# Patient Record
Sex: FEMALE | Race: WHITE | NOT HISPANIC OR LATINO | Employment: PART TIME | URBAN - METROPOLITAN AREA
[De-identification: names, ages, dates, MRNs, and addresses within clinical notes are randomized per-mention and may not be internally consistent; named-entity substitution may affect disease eponyms.]

---

## 2017-02-10 ENCOUNTER — OFFICE VISIT - HEALTHEAST (OUTPATIENT)
Dept: FAMILY MEDICINE | Facility: CLINIC | Age: 42
End: 2017-02-10

## 2017-02-10 DIAGNOSIS — J01.01 ACUTE RECURRENT MAXILLARY SINUSITIS: ICD-10-CM

## 2017-02-10 ASSESSMENT — MIFFLIN-ST. JEOR: SCORE: 1316.73

## 2017-05-23 ENCOUNTER — OFFICE VISIT - HEALTHEAST (OUTPATIENT)
Dept: FAMILY MEDICINE | Facility: CLINIC | Age: 42
End: 2017-05-23

## 2017-05-23 DIAGNOSIS — Z00.00 ROUTINE GENERAL MEDICAL EXAMINATION AT A HEALTH CARE FACILITY: ICD-10-CM

## 2017-05-23 DIAGNOSIS — Z79.899 MEDICATION MANAGEMENT: ICD-10-CM

## 2017-05-23 DIAGNOSIS — N76.0 ACUTE VAGINITIS: ICD-10-CM

## 2017-05-23 DIAGNOSIS — E78.5 DYSLIPIDEMIA: ICD-10-CM

## 2017-05-23 DIAGNOSIS — K21.9 GASTROESOPHAGEAL REFLUX DISEASE WITHOUT ESOPHAGITIS: ICD-10-CM

## 2017-05-23 DIAGNOSIS — G47.00 INSOMNIA, UNSPECIFIED TYPE: ICD-10-CM

## 2017-05-23 DIAGNOSIS — Z11.3 SCREENING FOR STD (SEXUALLY TRANSMITTED DISEASE): ICD-10-CM

## 2017-05-23 LAB
CHOLEST SERPL-MCNC: 185 MG/DL
FASTING STATUS PATIENT QL REPORTED: YES
HDLC SERPL-MCNC: 56 MG/DL
LDLC SERPL CALC-MCNC: 110 MG/DL
TRIGL SERPL-MCNC: 94 MG/DL

## 2017-05-23 ASSESSMENT — MIFFLIN-ST. JEOR: SCORE: 1303.12

## 2017-05-24 ENCOUNTER — AMBULATORY - HEALTHEAST (OUTPATIENT)
Dept: FAMILY MEDICINE | Facility: CLINIC | Age: 42
End: 2017-05-24

## 2017-05-24 ENCOUNTER — COMMUNICATION - HEALTHEAST (OUTPATIENT)
Dept: FAMILY MEDICINE | Facility: CLINIC | Age: 42
End: 2017-05-24

## 2017-05-24 DIAGNOSIS — G47.00 INSOMNIA, UNSPECIFIED TYPE: ICD-10-CM

## 2017-05-24 LAB
HSV1 IGG SERPL QL IA: POSITIVE
HSV2 IGG SERPL QL IA: NEGATIVE

## 2017-06-29 ENCOUNTER — OFFICE VISIT - HEALTHEAST (OUTPATIENT)
Dept: FAMILY MEDICINE | Facility: CLINIC | Age: 42
End: 2017-06-29

## 2017-06-29 DIAGNOSIS — G47.00 INSOMNIA, UNSPECIFIED TYPE: ICD-10-CM

## 2017-06-29 DIAGNOSIS — J01.00 SUBACUTE MAXILLARY SINUSITIS: ICD-10-CM

## 2017-06-29 ASSESSMENT — MIFFLIN-ST. JEOR: SCORE: 1295.4

## 2017-12-23 ENCOUNTER — COMMUNICATION - HEALTHEAST (OUTPATIENT)
Dept: FAMILY MEDICINE | Facility: CLINIC | Age: 42
End: 2017-12-23

## 2018-01-11 ENCOUNTER — RECORDS - HEALTHEAST (OUTPATIENT)
Dept: ADMINISTRATIVE | Facility: OTHER | Age: 43
End: 2018-01-11

## 2018-01-25 ENCOUNTER — RECORDS - HEALTHEAST (OUTPATIENT)
Dept: ADMINISTRATIVE | Facility: OTHER | Age: 43
End: 2018-01-25

## 2018-01-29 ENCOUNTER — RECORDS - HEALTHEAST (OUTPATIENT)
Dept: ADMINISTRATIVE | Facility: OTHER | Age: 43
End: 2018-01-29

## 2018-03-13 ENCOUNTER — COMMUNICATION - HEALTHEAST (OUTPATIENT)
Dept: FAMILY MEDICINE | Facility: CLINIC | Age: 43
End: 2018-03-13

## 2018-03-13 DIAGNOSIS — M26.609 TEMPOROMANDIBULAR JOINT DISORDER: ICD-10-CM

## 2018-03-19 ENCOUNTER — RECORDS - HEALTHEAST (OUTPATIENT)
Dept: ADMINISTRATIVE | Facility: OTHER | Age: 43
End: 2018-03-19

## 2018-03-22 ENCOUNTER — RECORDS - HEALTHEAST (OUTPATIENT)
Dept: ADMINISTRATIVE | Facility: OTHER | Age: 43
End: 2018-03-22

## 2018-04-09 ENCOUNTER — RECORDS - HEALTHEAST (OUTPATIENT)
Dept: ADMINISTRATIVE | Facility: OTHER | Age: 43
End: 2018-04-09

## 2018-04-23 ENCOUNTER — COMMUNICATION - HEALTHEAST (OUTPATIENT)
Dept: FAMILY MEDICINE | Facility: CLINIC | Age: 43
End: 2018-04-23

## 2018-04-30 ENCOUNTER — RECORDS - HEALTHEAST (OUTPATIENT)
Dept: ADMINISTRATIVE | Facility: OTHER | Age: 43
End: 2018-04-30

## 2018-05-16 ENCOUNTER — RECORDS - HEALTHEAST (OUTPATIENT)
Dept: ADMINISTRATIVE | Facility: OTHER | Age: 43
End: 2018-05-16

## 2018-05-24 ENCOUNTER — COMMUNICATION - HEALTHEAST (OUTPATIENT)
Dept: FAMILY MEDICINE | Facility: CLINIC | Age: 43
End: 2018-05-24

## 2018-06-18 ENCOUNTER — COMMUNICATION - HEALTHEAST (OUTPATIENT)
Dept: FAMILY MEDICINE | Facility: CLINIC | Age: 43
End: 2018-06-18

## 2018-06-18 DIAGNOSIS — M26.609 TEMPOROMANDIBULAR JOINT DISORDER: ICD-10-CM

## 2018-06-22 ENCOUNTER — COMMUNICATION - HEALTHEAST (OUTPATIENT)
Dept: FAMILY MEDICINE | Facility: CLINIC | Age: 43
End: 2018-06-22

## 2018-08-20 ENCOUNTER — COMMUNICATION - HEALTHEAST (OUTPATIENT)
Dept: FAMILY MEDICINE | Facility: CLINIC | Age: 43
End: 2018-08-20

## 2018-10-06 ENCOUNTER — COMMUNICATION - HEALTHEAST (OUTPATIENT)
Dept: FAMILY MEDICINE | Facility: CLINIC | Age: 43
End: 2018-10-06

## 2018-10-06 DIAGNOSIS — G47.00 INSOMNIA, UNSPECIFIED TYPE: ICD-10-CM

## 2018-11-07 ENCOUNTER — COMMUNICATION - HEALTHEAST (OUTPATIENT)
Dept: FAMILY MEDICINE | Facility: CLINIC | Age: 43
End: 2018-11-07

## 2018-11-21 ENCOUNTER — OFFICE VISIT - HEALTHEAST (OUTPATIENT)
Dept: FAMILY MEDICINE | Facility: CLINIC | Age: 43
End: 2018-11-21

## 2018-11-21 DIAGNOSIS — M51.26 RUPTURED LUMBAR DISC: ICD-10-CM

## 2018-11-21 DIAGNOSIS — Z01.818 PREOP GENERAL PHYSICAL EXAM: ICD-10-CM

## 2018-11-21 DIAGNOSIS — M26.609 TEMPOROMANDIBULAR JOINT DISORDER: ICD-10-CM

## 2018-11-21 DIAGNOSIS — K58.1 IRRITABLE BOWEL SYNDROME WITH CONSTIPATION: ICD-10-CM

## 2018-11-21 LAB
ALBUMIN UR-MCNC: NEGATIVE MG/DL
APPEARANCE UR: CLEAR
BILIRUB UR QL STRIP: NEGATIVE
COLOR UR AUTO: YELLOW
ERYTHROCYTE [DISTWIDTH] IN BLOOD BY AUTOMATED COUNT: 12.2 % (ref 11–14.5)
GLUCOSE UR STRIP-MCNC: NEGATIVE MG/DL
HCT VFR BLD AUTO: 41.4 % (ref 35–47)
HGB BLD-MCNC: 14.1 G/DL (ref 12–16)
HGB UR QL STRIP: NEGATIVE
KETONES UR STRIP-MCNC: NEGATIVE MG/DL
LEUKOCYTE ESTERASE UR QL STRIP: NEGATIVE
MCH RBC QN AUTO: 28.6 PG (ref 27–34)
MCHC RBC AUTO-ENTMCNC: 34 G/DL (ref 32–36)
MCV RBC AUTO: 84 FL (ref 80–100)
NITRATE UR QL: NEGATIVE
PH UR STRIP: 5.5 [PH] (ref 5–8)
PLATELET # BLD AUTO: 235 THOU/UL (ref 140–440)
PMV BLD AUTO: 8.2 FL (ref 7–10)
RBC # BLD AUTO: 4.92 MILL/UL (ref 3.8–5.4)
SP GR UR STRIP: 1.01 (ref 1–1.03)
UROBILINOGEN UR STRIP-ACNC: NORMAL
WBC: 5.3 THOU/UL (ref 4–11)

## 2018-11-21 RX ORDER — ALBUTEROL SULFATE 90 UG/1
2 AEROSOL, METERED RESPIRATORY (INHALATION)
Status: SHIPPED | COMMUNITY
Start: 2018-04-02

## 2018-11-21 RX ORDER — IBUPROFEN 600 MG/1
600 TABLET, FILM COATED ORAL
Status: SHIPPED | COMMUNITY
Start: 2018-03-11

## 2018-11-21 RX ORDER — HYDROCORTISONE ACETATE 25 MG/1
25 SUPPOSITORY RECTAL DAILY PRN
Qty: 24 SUPPOSITORY | Refills: 0 | Status: SHIPPED | OUTPATIENT
Start: 2018-11-21

## 2018-11-21 RX ORDER — ALBUTEROL SULFATE 0.83 MG/ML
2.5 SOLUTION RESPIRATORY (INHALATION)
Status: SHIPPED | COMMUNITY
Start: 2015-12-24

## 2018-11-21 ASSESSMENT — MIFFLIN-ST. JEOR: SCORE: 1314.29

## 2018-12-10 ENCOUNTER — OFFICE VISIT - HEALTHEAST (OUTPATIENT)
Dept: FAMILY MEDICINE | Facility: CLINIC | Age: 43
End: 2018-12-10

## 2018-12-10 ENCOUNTER — COMMUNICATION - HEALTHEAST (OUTPATIENT)
Dept: SCHEDULING | Facility: CLINIC | Age: 43
End: 2018-12-10

## 2018-12-10 DIAGNOSIS — J02.9 ACUTE PHARYNGITIS, UNSPECIFIED ETIOLOGY: ICD-10-CM

## 2018-12-10 DIAGNOSIS — B37.0 THRUSH: ICD-10-CM

## 2018-12-10 DIAGNOSIS — K21.9 GASTROESOPHAGEAL REFLUX DISEASE, ESOPHAGITIS PRESENCE NOT SPECIFIED: ICD-10-CM

## 2018-12-10 LAB — DEPRECATED S PYO AG THROAT QL EIA: NORMAL

## 2018-12-11 LAB — GROUP A STREP BY PCR: NORMAL

## 2018-12-19 ENCOUNTER — RECORDS - HEALTHEAST (OUTPATIENT)
Dept: ADMINISTRATIVE | Facility: OTHER | Age: 43
End: 2018-12-19

## 2019-01-09 ENCOUNTER — RECORDS - HEALTHEAST (OUTPATIENT)
Dept: ADMINISTRATIVE | Facility: OTHER | Age: 44
End: 2019-01-09

## 2019-06-06 ENCOUNTER — OFFICE VISIT - HEALTHEAST (OUTPATIENT)
Dept: FAMILY MEDICINE | Facility: CLINIC | Age: 44
End: 2019-06-06

## 2019-06-06 DIAGNOSIS — Z12.31 VISIT FOR SCREENING MAMMOGRAM: ICD-10-CM

## 2019-06-06 DIAGNOSIS — R09.81 CONGESTION OF PARANASAL SINUS: ICD-10-CM

## 2019-06-06 DIAGNOSIS — J01.00 ACUTE NON-RECURRENT MAXILLARY SINUSITIS: ICD-10-CM

## 2019-06-06 ASSESSMENT — MIFFLIN-ST. JEOR: SCORE: 1309.47

## 2019-08-04 ENCOUNTER — COMMUNICATION - HEALTHEAST (OUTPATIENT)
Dept: FAMILY MEDICINE | Facility: CLINIC | Age: 44
End: 2019-08-04

## 2019-08-04 DIAGNOSIS — M26.609 TEMPOROMANDIBULAR JOINT DISORDER: ICD-10-CM

## 2019-11-29 ENCOUNTER — RECORDS - HEALTHEAST (OUTPATIENT)
Dept: ADMINISTRATIVE | Facility: OTHER | Age: 44
End: 2019-11-29

## 2019-12-03 ENCOUNTER — COMMUNICATION - HEALTHEAST (OUTPATIENT)
Dept: FAMILY MEDICINE | Facility: CLINIC | Age: 44
End: 2019-12-03

## 2019-12-03 DIAGNOSIS — M26.609 TEMPOROMANDIBULAR JOINT DISORDER: ICD-10-CM

## 2019-12-06 ENCOUNTER — RECORDS - HEALTHEAST (OUTPATIENT)
Dept: ADMINISTRATIVE | Facility: OTHER | Age: 44
End: 2019-12-06

## 2020-01-22 ENCOUNTER — RECORDS - HEALTHEAST (OUTPATIENT)
Dept: ADMINISTRATIVE | Facility: OTHER | Age: 45
End: 2020-01-22

## 2020-02-21 ENCOUNTER — RECORDS - HEALTHEAST (OUTPATIENT)
Dept: ADMINISTRATIVE | Facility: OTHER | Age: 45
End: 2020-02-21

## 2020-03-27 ENCOUNTER — COMMUNICATION - HEALTHEAST (OUTPATIENT)
Dept: FAMILY MEDICINE | Facility: CLINIC | Age: 45
End: 2020-03-27

## 2020-05-18 ENCOUNTER — COMMUNICATION - HEALTHEAST (OUTPATIENT)
Dept: FAMILY MEDICINE | Facility: CLINIC | Age: 45
End: 2020-05-18

## 2020-05-18 DIAGNOSIS — M26.609 TEMPOROMANDIBULAR JOINT DISORDER: ICD-10-CM

## 2021-01-11 ENCOUNTER — COMMUNICATION - HEALTHEAST (OUTPATIENT)
Dept: FAMILY MEDICINE | Facility: CLINIC | Age: 46
End: 2021-01-11

## 2021-01-11 DIAGNOSIS — M26.609 TEMPOROMANDIBULAR JOINT DISORDER: ICD-10-CM

## 2021-05-05 ENCOUNTER — OFFICE VISIT - HEALTHEAST (OUTPATIENT)
Dept: FAMILY MEDICINE | Facility: CLINIC | Age: 46
End: 2021-05-05

## 2021-05-05 DIAGNOSIS — K21.00 GASTROESOPHAGEAL REFLUX DISEASE WITH ESOPHAGITIS WITHOUT HEMORRHAGE: ICD-10-CM

## 2021-05-05 DIAGNOSIS — M26.609 TEMPOROMANDIBULAR JOINT DISORDER: ICD-10-CM

## 2021-05-05 DIAGNOSIS — K58.9 IRRITABLE BOWEL SYNDROME, UNSPECIFIED TYPE: ICD-10-CM

## 2021-05-05 DIAGNOSIS — Z01.818 PRE-OP EXAM: ICD-10-CM

## 2021-05-05 RX ORDER — CYCLOBENZAPRINE HCL 10 MG
TABLET ORAL
Qty: 30 TABLET | Refills: 0 | Status: SHIPPED | OUTPATIENT
Start: 2021-05-05

## 2021-05-05 ASSESSMENT — MIFFLIN-ST. JEOR: SCORE: 1283.84

## 2021-05-27 VITALS
WEIGHT: 141.1 LBS | BODY MASS INDEX: 22.68 KG/M2 | HEART RATE: 72 BPM | SYSTOLIC BLOOD PRESSURE: 100 MMHG | HEIGHT: 66 IN | OXYGEN SATURATION: 98 % | DIASTOLIC BLOOD PRESSURE: 70 MMHG | TEMPERATURE: 97.8 F

## 2021-05-28 ASSESSMENT — ASTHMA QUESTIONNAIRES: ACT_TOTALSCORE: 25

## 2021-05-29 NOTE — PROGRESS NOTES
Assessment and Plan:     1. Acute non-recurrent maxillary sinusitis  Will treat with Augmentin.  Educated on its indications and side effects.  Provided Diflucan to use as needed if yeast vaginitis develops.  If no improvement with symptoms, the patient is to follow-up.  The patient is content with the plan.   - amoxicillin-clavulanate (AUGMENTIN) 875-125 mg per tablet; Take 1 tablet by mouth 2 (two) times a day for 10 days.  Dispense: 20 tablet; Refill: 0  - fluconazole (DIFLUCAN) 150 MG tablet; Take 1 tablet (150 mg total) by mouth once for 1 dose.  Dispense: 1 tablet; Refill: 1    2. Congestion of paranasal sinus  Discussed symptomatic treatment including OTC nasal saline sprays.  Recommend an OTC antihistamine for underlying allergies.  She is content with the plan.     3. Visit for screening mammogram  - Mammo Screening Bilateral; Future    Subjective:     Patti is a 43 y.o. female presenting to the clinic for concerns for cold symptoms for 3 to 4 weeks.  She complains of sinus congestion with purulent drainage, facial pain and pressure, productive cough, sore throat, headache, postnasal drainage.  She denies stomachache, nausea, vomiting.  Patient returned from [place] on Sunday.  Tuesday, she developed worsening symptoms including low-grade fever of 99.6.  Patient works at [employer] in the [employer].  She has been taking over-the-counter NyQuil, DayQuil, Mucinex D, and an antihistamine.  She feels as though her symptoms are worsening.    Review of Systems: A complete 14 point review of systems was obtained and is negative or as stated in the history of present illness.    Social History     Socioeconomic History     Marital status:      Spouse name: Not on file     Number of children: Not on file     Years of education: Not on file     Highest education level: Not on file   Occupational History     Not on file   Social Needs     Financial resource strain: Not on file     Food insecurity:      Worry: Not on file     Inability: Not on file     Transportation needs:     Medical: Not on file     Non-medical: Not on file   Tobacco Use     Smoking status: Former Smoker     Smokeless tobacco: Never Used     Tobacco comment: socially   Substance and Sexual Activity     Alcohol use: Yes     Alcohol/week: 0.6 oz     Types: 1 Glasses of wine per week     Frequency: 2-3 times a week     Drinks per session: 1 or 2     Binge frequency: Never     Drug use: No     Sexual activity: Yes     Partners: Male     Comment:    Lifestyle     Physical activity:     Days per week: Not on file     Minutes per session: Not on file     Stress: Not on file   Relationships     Social connections:     Talks on phone: Not on file     Gets together: Not on file     Attends Denominational service: Not on file     Active member of club or organization: Not on file     Attends meetings of clubs or organizations: Not on file     Relationship status: Not on file     Intimate partner violence:     Fear of current or ex partner: Not on file     Emotionally abused: Not on file     Physically abused: Not on file     Forced sexual activity: Not on file   Other Topics Concern     Not on file   Social History Narrative     Not on file       Active Ambulatory Problems     Diagnosis Date Noted     Hyperlipidemia      Female Stress Incontinence      Vitamin D Deficiency      Reactive Airway Disease      Temporomandibular Joint-pain Dysfunction Syndrome      Esophageal Reflux      Nephrotic Syndrome      Insomnia      Vaginal vault prolapse after hysterectomy 11/15/2013     Scapular dysfunction 06/13/2016     Median arcuate ligament syndrome (H) 08/11/2016     Resolved Ambulatory Problems     Diagnosis Date Noted     Eustachian Tube Dysfunction      Acute upper respiratory infection      Edema      Acute Sinusitis      Past Medical History:   Diagnosis Date     GERD (gastroesophageal reflux disease)      Hyperlipidemia        Family History   Problem  "Relation Age of Onset     Acute Myocardial Infarction Mother      Heart disease Mother      Hypertension Father      Hyperlipidemia Father      Heart disease Father      Prostate cancer Father      Colon cancer Maternal Grandfather      Other Sister         DJD      Other Brother         DJD     No Medical Problems Maternal Grandmother      Kidney nephrosis Paternal Grandmother      Colitis Paternal Grandfather      Depression Daughter      Depression Daughter      No Medical Problems Son        Objective:     BP 90/68   Pulse 82   Temp 98.3  F (36.8  C)   Ht 5' 7\" (1.702 m)   Wt 141 lb 8 oz (64.2 kg)   SpO2 98%   BMI 22.16 kg/m      Patient is alert, in no obvious distress.   Skin: Warm, dry.  No lesions or rashes.  Skin turgor rapid return.   HEENT:  Head normocephalic, atraumatic.  Eyes normal. Ears normal.  Nose patent, mucosa pink.  Oropharynx mucosa red.  No lesions or tonsillar enlargement.   Sinuses: Tenderness to palpation of bilateral maxillary sinuses.   Neck: Supple, no lymphadenopathy.  Lungs:  Clear to auscultation. Respirations even and unlabored.  No wheezing or rales noted.   Heart:  Regular rate and rhythm.  No murmurs.                "

## 2021-05-30 VITALS — HEIGHT: 67 IN | WEIGHT: 143.1 LBS | BODY MASS INDEX: 22.46 KG/M2

## 2021-05-31 VITALS — HEIGHT: 66 IN | BODY MASS INDEX: 22.8 KG/M2 | WEIGHT: 141.9 LBS

## 2021-05-31 VITALS — WEIGHT: 140.1 LBS | HEIGHT: 67 IN | BODY MASS INDEX: 21.99 KG/M2

## 2021-05-31 NOTE — TELEPHONE ENCOUNTER
RN cannot approve Refill Request    RN can NOT refill this medication med is not covered by policy/route to provider. Last office visit: Visit date not found Last Physical: 11/21/2018 Last MTM visit: Visit date not found Last visit same specialty: 6/6/2019 Ana Cristina Suarez, CNP.  Next visit within 3 mo: Visit date not found  Next physical within 3 mo: Visit date not found      Sun Dave, [facility] Triage/Med Refill 8/4/2019    Requested Prescriptions   Pending Prescriptions Disp Refills     cyclobenzaprine (FLEXERIL) 10 MG tablet [Pharmacy Med Name: CYCLOBENZAPRINE 10MG TABLETS] 30 tablet 0     Sig: TAKE 1 TABLET(10 MG) BY MOUTH THREE TIMES DAILY AS NEEDED FOR MUSCLE SPASMS       There is no refill protocol information for this order

## 2021-06-02 VITALS — HEIGHT: 67 IN | BODY MASS INDEX: 22.37 KG/M2 | WEIGHT: 142.56 LBS

## 2021-06-03 VITALS — HEIGHT: 67 IN | BODY MASS INDEX: 22.21 KG/M2 | WEIGHT: 141.5 LBS

## 2021-06-03 NOTE — TELEPHONE ENCOUNTER
RN cannot approve Refill Request    RN can NOT refill this medication med is not covered by policy/route to provider. Last office visit: Visit date not found Last Physical: Visit date not found Last MTM visit: Visit date not found Last visit same specialty: 6/6/2019 Ana Cristina Suarez, NAZANIN.  Next visit within 3 mo: Visit date not found  Next physical within 3 mo: Visit date not found      Alyssa Jones, [facility] Triage/Med Refill 12/3/2019    Requested Prescriptions   Pending Prescriptions Disp Refills     cyclobenzaprine (FLEXERIL) 10 MG tablet [Pharmacy Med Name: CYCLOBENZAPRINE 10MG TABLETS] 30 tablet 0     Sig: TAKE 1 TABLET(10 MG) BY MOUTH THREE TIMES DAILY AS NEEDED FOR MUSCLE SPASMS       There is no refill protocol information for this order

## 2021-06-07 NOTE — TELEPHONE ENCOUNTER
Called pt and left a voicemail to call us and set up an asthma check w/Ana Cristina Suarez.  This will be scheduled as a telephone visit for (40 min).    Thanks

## 2021-06-08 NOTE — PROGRESS NOTES
Patti is a 41 y.o. female presenting to the clinic for concerns for sinusitis.  Patient states she developed symptoms December 2.  During the summer, she percent presented to [facility] where she was treated with Augmentin.  Patient states she was noncompliant with the medication.  Symptoms returned 3 weeks ago in which she has been experiencing sinus congestion with purulent drainage, facial pain and pressure, headache, bilateral ear pain, sore throat, postnasal drainage.  She is waking up with a headache and nausea.  No fever has been present.  She has been taking over-the-counter Motrin and Tylenol.  She works at [employer].    Review of Systems: A complete 14 point review of systems was obtained and is negative or as stated in the history of present illness.    Social History     Social History     Marital status:      Spouse name: N/A     Number of children: N/A     Years of education: N/A     Occupational History     Not on file.     Social History Main Topics     Smoking status: Current Some Day Smoker     Smokeless tobacco: Not on file     Alcohol use 0.6 oz/week     1 Glasses of wine per week     Drug use: No     Sexual activity: Not on file     Other Topics Concern     Not on file     Social History Narrative       Active Ambulatory Problems     Diagnosis Date Noted     Hyperlipidemia      Female Stress Incontinence      Vitamin D Deficiency      Reactive Airway Disease      Temporomandibular Joint-pain Dysfunction Syndrome      Esophageal Reflux      Edema      Nephrotic Syndrome      Insomnia      Resolved Ambulatory Problems     Diagnosis Date Noted     Eustachian Tube Dysfunction      Acute upper respiratory infection      Acute Sinusitis      Past Medical History:   Diagnosis Date     GERD (gastroesophageal reflux disease)      Hyperlipidemia        Family History   Problem Relation Age of Onset     Acute Myocardial Infarction Mother      Heart disease Mother      Hypertension Father  "     Hyperlipidemia Father      Heart disease Father        OBJECTIVE:     Visit Vitals     /62 (Patient Site: Right Arm, Patient Position: Sitting, Cuff Size: Adult Regular)     Pulse 91     Temp 98.7  F (37.1  C)     Ht 5' 7\" (1.702 m)     Wt 143 lb 1.6 oz (64.9 kg)     SpO2 97%     BMI 22.41 kg/m2       Patient is alert, in no obvious distress.   Skin: Warm, dry.  No lesions or rashes.  Skin turgor rapid return.   HEENT:  Head normocephalic, atraumatic.  Eyes normal.  Ears normal.  Nose patent, mucosa red.  Oropharynx mucosa pink.  No lesions or tonsillar enlargement.   Sinuses: Tenderness to palpation of bilateral maxillary sinuses.   Neck: Supple, no lymphadenopathy.  Lungs:  Clear to auscultation. Respirations even and unlabored.  No wheezing or rales noted.   Heart:  Regular rate and rhythm.  No murmurs.      ASSESSMENT AND PLAN:     1. Acute recurrent maxillary sinusitis  Will treat with Levofloxacin.  Educated on its indications and side effects. Discussed symptomatic treatment including OTC nasal saline sprays.  She is concerned for possible yeast infection from antibiotic.  Provided prescription for Diflucan.  She is to avoid taking this with her atorvastatin.  If no improvement with symptoms, the patient is to follow-up.  The patient is content with the plan.   - levoFLOXacin (LEVAQUIN) 750 MG tablet; Take 1 tablet (750 mg total) by mouth daily for 5 days.  Dispense: 5 tablet; Refill: 0  - fluconazole (DIFLUCAN) 150 MG tablet; Take 1 tablet (150 mg total) by mouth once for 1 dose.  Dispense: 1 tablet; Refill: 1    "

## 2021-06-08 NOTE — TELEPHONE ENCOUNTER
RN cannot approve Refill Request    RN can NOT refill this medication med is not covered by policy/route to provider. Last office visit: Visit date not found Last Physical: Visit date not found Last MTM visit: Visit date not found Last visit same specialty: 6/6/2019 Ana Cristina Suarez, NAZANIN.  Next visit within 3 mo: Visit date not found  Next physical within 3 mo: Visit date not found      Naima Fermin, [facility] Triage/Med Refill 5/19/2020    Requested Prescriptions   Pending Prescriptions Disp Refills     cyclobenzaprine (FLEXERIL) 10 MG tablet [Pharmacy Med Name: CYCLOBENZAPRINE 10MG TABLETS] 30 tablet 0     Sig: TAKE 1 TABLET(10 MG) BY MOUTH THREE TIMES DAILY AS NEEDED FOR MUSCLE SPASMS       There is no refill protocol information for this order

## 2021-06-10 NOTE — PROGRESS NOTES
Assessment and Plan:    1. Routine general medical examination at a health care facility  Discussed consuming a healthy diet and exercising.  Discussed importance of routine sunscreen.  Discussed adequate calcium and vitamin D intake. She is due for mammogram.  - Mammo Screening Bilateral; Future    2. Gastroesophageal reflux disease without esophagitis  She continues Pantoprazole and Sucralfate.     3. Screening for STD (sexually transmitted disease)   Discussed safe sex practices.  Will notify patient of results.     - Chlamydia trachomatis & Neisseria gonorrhoeae, Amplified Detection  - Herpes simplex Virus (Type 1 and 2) IgG Antibody    4. Dyslipidemia  She continues Atorvastatin 20 mg daily.    - Lipid Cascade    5. Medication management  - Comprehensive Metabolic Panel    6. Insomnia, unspecified type  We will discontinue trazodone and start hydroxyzine to be taken as needed for insomnia.  She is to avoid taking this with other sedatives.  Discussed good sleep hygiene.  Offered referral to sleep center, but she declines.  - hydrOXYzine (ATARAX) 50 MG tablet; Take one tablet by mouth as needed for sleep  Dispense: 30 tablet; Refill: 0    7. Acute vaginitis  Discussed the external irritation.  Will treat with nystatin triamcinolone cream.  Will rule out herpes.  Did also discuss lichen sclerosis is a possibility.  If symptoms persist, may consider referral to gynecology.  She is content with the plan.  - nystatin-triamcinolone (MYCOLOG) ointment; Apply to the affected area twice daily as needed.  No longer than 2 weeks.  Dispense: 30 g; Refill: 1  - Wet Prep, Vaginal        Subjective:     Patti is a 41 y.o. female presenting to the clinic for a female physical.     LMP: hysterectomy 2003 for uterine prolapse  Hx of abnormal pap smear: none  Last pap smear: 2003  Perform self-breast exams: yes   Vaginal discharge or irritation: yes, see note  Sexually active:  23 years  Contraception: none   Concerns for  STDs: yes  Previous pregnancies:three pregnancies, vaginal deliveries     Patient has been seeing gastroenterology due to abdominal pain.  She has IBS-constipation.  Last week, she presented to the emergency room due to abdominal pain.  She had an endoscopy showing inflammation.  Her liver enzymes were noted to be elevated.  Patient would like this rechecked today.  She consumes at most 1 alcoholic beverage per week.  States the pain is within the right upper abdomen.  Patient takes Linzess.  She takes pantoprazole and sucralfate for GERD.     Patient takes atorvastatin 20 mg daily for hyperlipidemia.  She uses cyclobenzaprine as needed for TMJ.    She has been experiencing right labial irritation for 6 weeks.  She denies vaginal discharge and pain.  She has had  intermittent itching.  She has tried Neosporin, Vagisil, Monistat, and cortisone cream with no relief.  She is concerned for STDs.    She is also concerned for insomnia.  Patient has been taking trazodone as needed but it has not been providing assistance.  She has difficulty both falling asleep and staying asleep.  She does not snore but her  does snore.    Review of systems:  I performed a 10 point review of systems.  All pertinent positives and negatives are noted in the HPI. All others are negative.     No Known Allergies    Current Outpatient Prescriptions on File Prior to Visit   Medication Sig Dispense Refill     atorvastatin (LIPITOR) 20 MG tablet Take 1 tablet (20 mg total) by mouth bedtime. 30 tablet 11     cyclobenzaprine (FLEXERIL) 10 MG tablet TAKE 1 TABLET BY MOUTH EVERY 8 HOURS AS NEEDED FOR MUSCLE SPASM 30 tablet 2     LINZESS 290 mcg cap capsule Take 1 capsule by mouth once a week.  2     pantoprazole (PROTONIX) 40 MG tablet TAKE ONE TABLET BY MOUTH EVERY DAY 90 tablet 1     sucralfate (CARAFATE) 1 gram tablet Take 1 tablet by mouth daily.  4     [DISCONTINUED] traZODone (DESYREL) 100 MG tablet TAKE A HALF TABLET TO 1 TABLET BY MOUTH  EVERY NIGHT AT BEDTIME AS NEEDED 30 tablet 0     No current facility-administered medications on file prior to visit.        Social History     Social History     Marital status:      Spouse name: N/A     Number of children: N/A     Years of education: N/A     Occupational History     Not on file.     Social History Main Topics     Smoking status: Former Smoker     Smokeless tobacco: Not on file     Alcohol use 0.6 oz/week     1 Glasses of wine per week     Drug use: No     Sexual activity: Not on file      Comment:      Other Topics Concern     Not on file     Social History Narrative       Past Medical History:   Diagnosis Date     GERD (gastroesophageal reflux disease)      Hyperlipidemia        Family History   Problem Relation Age of Onset     Acute Myocardial Infarction Mother      Heart disease Mother      Hypertension Father      Hyperlipidemia Father      Heart disease Father      Lymphoma Father      Colon cancer Maternal Grandfather        Past Surgical History:   Procedure Laterality Date     HIP SURGERY Bilateral     IT band ligament repairs      medial arcuate ligament release       [place] LAP,VAG HYST,UTERUS 250GMS/<      Description: Laparoscopy With Vaginal Hysterectomy;  Recorded: 11/28/2007;     [place] REMOVAL GALLBLADDER      Description: Cholecystectomy;  Recorded: 11/28/2007;       Objective:     Vitals:    05/23/17 0746   BP: 100/60   Pulse: 75   SpO2: 99%       Patient is alert, no obvious distress.   Skin: Warm, dry.  No rashes or lesions. Skin turgor rapid return.   HEENT:  Eyes normal.  Ears normal.  Nose patent, mucosa pink.  Oropharynx mucosa pink, no lesions or tonsil enlargement.   Neck:  Supple, without lymphadenopathy, bruits, JVD. Thyroid normal texture and size.    Lungs:  Clear to auscultation.  No wheezing, rales noted.  Respirations even and unlabored.   Heart:  Regular rate and rhythm.  No murmurs.   Breasts:  Normal.  No surrounding adenopathy.   Abdomen: Soft,  nontender.  No organomegaly.  Bowel sounds normoactive.  No guarding or masses noted.   :  She has a small patch of erythema noted on the edge of the right labia majora.   Normal vaginal mucosa.    Musculoskeletal:  Full ROM of extremities.  Muscle strength equal +5/5.   Neurological:  Cranial nerves 2-12 intact.

## 2021-06-11 NOTE — PROGRESS NOTES
Assessment:     Patti was seen today for sinus problem and medication questions.    Diagnoses and all orders for this visit:    Subacute maxillary sinusitis  -     azithromycin (ZITHROMAX) 250 MG tablet; Take 2 tablets on day 1, then 1 tablet for 4 days.  -     fluconazole (DIFLUCAN) 150 MG tablet; Take 1 tablet (150 mg total) by mouth once for 1 dose.    Insomnia, unspecified type  -     hydrOXYzine (ATARAX) 50 MG tablet; Take three tablets by mouth at bedtime as needed for sleep        Plan:     1. Subacute maxillary sinusitis  We will order the Diflucan in case she gets a yeast infection after treatment of the maxillary sinusitis.  Also suggest hot packs to the sinuses 20 minutes 3 times a day  - azithromycin (ZITHROMAX) 250 MG tablet; Take 2 tablets on day 1, then 1 tablet for 4 days.  Dispense: 6 tablet; Refill: 1  - fluconazole (DIFLUCAN) 150 MG tablet; Take 1 tablet (150 mg total) by mouth once for 1 dose.  Dispense: 2 tablet; Refill: 0    2. Insomnia, unspecified type  Will increase the hydroxyzine to 3 tablets by mouth at bedtime when she does this shift change once or twice a week.  - hydrOXYzine (ATARAX) 50 MG tablet; Take three tablets by mouth at bedtime as needed for sleep  Dispense: 90 tablet; Refill: 5      This is a 25 minute visit with greater than 50% of the time spent counseling regarding discussion regarding throughout treatment of maxillary sinusitis.  Encourage saline rinses with a [place] pot and hot packs      Subjective:      Sheila is a 41 y.o. female presenting to my clinic for evaluation of pain in her face.  Patient has had problems all spring with seasonal allergies.  She takes Claritin.  She also uses a Ann pot.    In the last several days her symptoms have heightened where she is totally congested shot within the maxillary sinus.  In the morning she has yellow drainage from the back of her throat in the evening it is clear to opaque.    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  She has not had any fevers.    She also has issues when she does shift change.  When she goes from working nights to getting back to working days she has usually a day where she has trouble sleeping.  She has been using hydroxyzine 50 mg tablets 2 at night and she wonders if she could go up to 3.  We discussed this and I okay going up to 3 tablets once or twice a week.  That is 150 mg total.  She has not tolerated trazodone in the past.      Current Outpatient Prescriptions on File Prior to Visit   Medication Sig Dispense Refill     atorvastatin (LIPITOR) 20 MG tablet Take 1 tablet (20 mg total) by mouth bedtime. 30 tablet 11     cyclobenzaprine (FLEXERIL) 10 MG tablet TAKE 1 TABLET BY MOUTH EVERY 8 HOURS AS NEEDED FOR MUSCLE SPASM 30 tablet 2     LINZESS 290 mcg cap capsule Take 1 capsule by mouth once a week.  2     nystatin-triamcinolone (MYCOLOG) ointment Apply to the affected area twice daily as needed.  No longer than 2 weeks. 30 g 1     pantoprazole (PROTONIX) 40 MG tablet TAKE ONE TABLET BY MOUTH EVERY DAY 90 tablet 1     sucralfate (CARAFATE) 1 gram tablet Take 1 tablet by mouth daily.  4     [DISCONTINUED] hydrOXYzine (ATARAX) 50 MG tablet Take two tablets by mouth at bedtime as needed for sleep 60 tablet 5     No current facility-administered medications on file prior to visit.      No Known Allergies  Past Medical History:   Diagnosis Date     GERD (gastroesophageal reflux disease)      Hyperlipidemia      Past Surgical History:   Procedure Laterality Date     HIP SURGERY Bilateral     IT band ligament repairs      medial arcuate ligament release       [place] LAP,VAG HYST,UTERUS 250GMS/<      Description: Laparoscopy With Vaginal Hysterectomy;  Recorded: 11/28/2007;     [place] REMOVAL GALLBLADDER      Description: Cholecystectomy;  Recorded: 11/28/2007;     Social History     Social History     Marital status:      Spouse name:  "N/A     Number of children: N/A     Years of education: N/A     Occupational History     Not on file.     Social History Main Topics     Smoking status: Former Smoker     Smokeless tobacco: Not on file     Alcohol use 0.6 oz/week     1 Glasses of wine per week     Drug use: No     Sexual activity: Not on file      Comment:      Other Topics Concern     Not on file     Social History Narrative     Family History   Problem Relation Age of Onset     Acute Myocardial Infarction Mother      Heart disease Mother      Hypertension Father      Hyperlipidemia Father      Heart disease Father      Lymphoma Father      Colon cancer Maternal Grandfather        ROS:  I have performed a 10 point ROS.  All pertinent positives and negatives are found in the HPI.  All others are negative.    No fevers    Objective:     Physical Exam:  /60 (Patient Site: Right Arm, Patient Position: Sitting, Cuff Size: Adult Regular)  Pulse 84  Temp 98.4  F (36.9  C) (Oral)   Ht 5' 6\" (1.676 m)  Wt 141 lb 14.4 oz (64.4 kg)  BMI 22.9 kg/m2  General Appearance: Alert, cooperative, no distress, appears stated age  Head: Normocephalic, without obvious abnormality, atraumatic/  The anterior facial pain over the maxillary sinuses quite marked ethmoid sinuses also tender but frontal sinuses are negative for pain  Eyes: PERRL, conjunctiva/corneas clear, EOM's intact  Ears: Normal TM's and external ear canals, both ears  Nose: Nares normal, septum midline,mucosa normal, yellow  drainage  Throat: Lips, mucosa, and tongue normal; teeth and gums normal  Neck: Supple, symmetrical, trachea midline, no adenopathy;  thyroid: not enlarged, symmetric, no tenderness/mass/nodules; no carotid bruit or JVD  Lungs: Clear to auscultation bilaterally, respirations unlabored  Heart: Regular rate and rhythm, S1 and S2 normal, no murmur, rub, or gallop,   .    Skin: Skin color, texture, turgor normal, no rashes or lesions     Mental status:  Appropriate, " Affect normal

## 2021-06-14 NOTE — TELEPHONE ENCOUNTER
RN cannot approve Refill Request    RN can NOT refill this medication med is not covered by policy/route to provider. Last office visit: Visit date not found Last Physical: Visit date not found Last MTM visit: Visit date not found Last visit same specialty: 6/6/2019 Ana Cristina Suarez, NAZANIN.  Next visit within 3 mo: Visit date not found  Next physical within 3 mo: Visit date not found      Shaylee Lema, [facility] Triage/Med Refill 1/11/2021    Requested Prescriptions   Pending Prescriptions Disp Refills     cyclobenzaprine (FLEXERIL) 10 MG tablet [Pharmacy Med Name: CYCLOBENZAPRINE 10MG TABLETS] 30 tablet 0     Sig: TAKE 1 TABLET(10 MG) BY MOUTH THREE TIMES DAILY AS NEEDED FOR MUSCLE SPASMS       There is no refill protocol information for this order

## 2021-06-17 NOTE — PROGRESS NOTES
[facility]  [address]   [facility] [address]  Dept: [phone]  Dept Fax: [phone]  Primary Provider: Darlyn Gutierres MD  Pre-op Performing Provider: STEPHANIE DOAN    PREOPERATIVE EVALUATION:  Today's date: 5/5/2021    Patti Herrera is a 45 y.o. female who presents for a preoperative evaluation.    Surgical Information:  Surgery/Procedure: upper endoscopy  Surgery Location: [facility]  Surgeon: dr radha gonzales  Surgery Date: 05/11/2021  Time of Surgery: 12 pm  Where patient plans to recover: At home with family  Fax number for surgical facility: [phone], [phone]    Type of Anesthesia Anticipated: to be determined    Assessment & Plan      45-year-old female with past medical history of irritable bowel, GERD, asthma, chronic back pain who presents for preop examination.  Low risk procedure with EGD and patient is overall healthy.  Exam unremarkable today.  Did not recommend any lab work today.  Approved to proceed with procedure.    1. Gastroesophageal reflux disease with esophagitis without hemorrhage  - esomeprazole (NEXIUM) 20 MG capsule; Take 1 capsule (20 mg total) by mouth daily.  Dispense: 30 capsule; Refill: 1    2. Temporomandibular Joint-pain Dysfunction Syndrome  - cyclobenzaprine (FLEXERIL) 10 MG tablet; TAKE 1 TABLET(10 MG) BY MOUTH THREE TIMES DAILY AS NEEDED FOR MUSCLE SPASMS  Dispense: 30 tablet; Refill: 0    3. Irritable bowel syndrome, unspecified type    4. Pre-op exam    The proposed surgical procedure is considered LOW risk.    Medication Instructions:  Patient to hold all medications day of.  Recommended discussing motegrity and Nexium with GI physican    RECOMMENDATION:  APPROVAL GIVEN to proceed with proposed procedure, without further diagnostic evaluation.[id number]}    Subjective      HPI related to upcoming procedure: patient has had irritable bowel issues for years. Associated reflux.      Preop Questions 5/5/2021   Have you  "ever had a heart attack or stroke? No   Have you ever had surgery on your heart or blood vessels, such as a stent placement, a coronary artery bypass, or surgery on an artery in your head, neck, heart, or legs? YES - median arcuate ligament release off celiac artery-no stents   Do you have chest pain with activity? No   Do you have a history of  heart failure? No   Do you currently have a cold, bronchitis or symptoms of other infection? No   Do you have a cough, shortness of breath, or wheezing? No   Do you or anyone in your family have previous history of blood clots? No   Do you or does anyone in your family have a serious bleeding problem such as prolonged bleeding following surgeries or cuts? No   Have you ever had problems with anemia or been told to take iron pills? No   Have you had any abnormal blood loss such as black, tarry or bloody stools, or abnormal vaginal bleeding? No   Have you ever had a blood transfusion? No   Are you willing to have a blood transfusion if it is medically needed before, during, or after your surgery? Yes   Have you or any of your relatives ever had problems with anesthesia? UNKNOWN - She does not think so.  Mother maybe had problems \"Ether\"   Do you have sleep apnea, excessive snoring or daytime drowsiness? No   Do you have any artifical heart valves or other implanted medical devices like a pacemaker, defibrillator, or continuous glucose monitor? No   Do you have artificial joints? No   Are you allergic to latex? No   Is there any chance that you may be pregnant? No     Health Care Directive:  Patient does not have a Health Care Directive or Living Will: Full Code    Preoperative Review of :    reviewed - no record of controlled substances prescribed.    ASTHMA - Patient has a longstanding history of moderate-severe Asthma . Patient has been doing well overall noting NO SYMPTOMS and continues on medication regimen consisting of albuterol without adverse reactions or side " effects.       Review of Systems  Complete ROS is negative except as noted in the HPI    Patient Active Problem List    Diagnosis Date Noted     Median arcuate ligament syndrome (H) 08/11/2016     Scapular dysfunction 06/13/2016     Nephrotic Syndrome      Insomnia      Hyperlipidemia      Female Stress Incontinence      Vitamin D Deficiency      Asthma      Temporomandibular Joint-pain Dysfunction Syndrome      Esophageal Reflux      Vaginal vault prolapse after hysterectomy 11/15/2013     Past Medical History:   Diagnosis Date     GERD (gastroesophageal reflux disease)      Hyperlipidemia      Past Surgical History:   Procedure Laterality Date     HIP SURGERY Bilateral     IT band ligament repairs      medial arcuate ligament release       SC LAP,VAG HYST,UTERUS 250GMS/<      Description: Laparoscopy With Vaginal Hysterectomy;  Recorded: 11/28/2007;     SC REMOVAL GALLBLADDER      Description: Cholecystectomy;  Recorded: 11/28/2007;     Current Outpatient Medications   Medication Sig Dispense Refill     albuterol (PROVENTIL HFA) 90 mcg/actuation inhaler Inhale 2 puffs.       albuterol (PROVENTIL) 2.5 mg /3 mL (0.083 %) nebulizer solution Inhale 2.5 mg.       cyclobenzaprine (FLEXERIL) 10 MG tablet TAKE 1 TABLET(10 MG) BY MOUTH THREE TIMES DAILY AS NEEDED FOR MUSCLE SPASMS 30 tablet 0     hydrocortisone (ANUCORT-HC) 25 mg suppository Insert 1 suppository (25 mg total) into the rectum daily as needed. 24 suppository 0     ibuprofen (ADVIL,MOTRIN) 600 MG tablet Take 600 mg by mouth.       MOTEGRITY 2 mg Tab        AMITIZA 24 mcg capsule TK 1 C PO BID WITH MEALS  0     amitriptyline (ELAVIL) 25 MG tablet Take 1 tablet by mouth at bedtime as needed.             atorvastatin (LIPITOR) 20 MG tablet Take 20 mg by mouth.       hydrOXYzine HCl (ATARAX) 50 MG tablet TAKE 3 TABLETS BY MOUTH AT BEDTIME AS NEEDED FOR SLEEP 90 tablet 0     linaclotide (LINZESS) 145 mcg cap capsule Take 145 mcg by mouth.       LINZESS 290 mcg cap  "capsule Take 1 capsule (290 mcg total) by mouth once a week. 30 capsule 0     ranitidine (ZANTAC) 150 MG tablet TAKE 1 TABLET BY MOUTH TWICE DAILY       sucralfate (CARAFATE) 1 gram tablet Take 1 tablet by mouth daily.  4     No current facility-administered medications for this visit.        Allergies   Allergen Reactions     Codeine Nausea And Vomiting     Morphine Nausea And Vomiting       Social History     Tobacco Use     Smoking status: Former Smoker     Smokeless tobacco: Never Used     Tobacco comment: socially   Substance Use Topics     Alcohol use: Yes     Alcohol/week: 1.0 standard drinks     Types: 1 Glasses of wine per week     Frequency: 2-3 times a week     Drinks per session: 1 or 2     Binge frequency: Never      Family History   Problem Relation Age of Onset     Acute Myocardial Infarction Mother      Heart disease Mother      Hypertension Father      Hyperlipidemia Father      Heart disease Father      Prostate cancer Father      Colon cancer Maternal Grandfather      Other Sister         DJD      Other Brother         DJD     No Medical Problems Maternal Grandmother      Kidney nephrosis Paternal Grandmother      Colitis Paternal Grandfather      Depression Daughter      Depression Daughter      No Medical Problems Son      Social History     Substance and Sexual Activity   Drug Use No        Objective       Physical Exam  /70 (Patient Site: Right Arm, Patient Position: Sitting, Cuff Size: Adult Regular)   Pulse 72   Temp 97.8  F (36.6  C)   Ht 5' 5.5\" (1.664 m)   Wt 141 lb 1.6 oz (64 kg)   SpO2 98%   BMI 23.12 kg/m      General appearance: Alert, cooperative, no distress, appears stated age  Head: Normocephalic, atraumatic, without obvious abnormality  Eyes: Pupils equal round, reactive.  Conjunctiva clear.  Ears:  No cerumen, TM's grey dull with structures seen bilaterally  Nose: Nares normal, no drainage.  Throat: Lips, mucosa, tongue normal mucosa pink and moist  Neck: Supple, " symmetric, trachea midline  Lungs: Clear to auscultation bilaterally, no wheezing or crackles present.  Respirations unlabored  Heart: Regular rate and rhythm, normal S1 and S2, no murmur, rub or gallop.  Abdomen: Soft, nontender, nondistended.  Bowel sounds active in all 4 quadrants.  No masses or organomegaly.  Extremities: Extremities normal, atraumatic.  No cyanosis or edema.  Skin: Skin color, texture, turgor normal no rashes or lesions on limited skin exam      No results for input(s): HGB, PLT, INR, NA, K, CREATININE, HBA1C in the last 14889 hours.     PRE-OP Diagnostics:   No labs were ordered during this visit.  No EKG required for low risk surgery (cataract, skin procedure, breast biopsy, etc).    REVISED CARDIAC RISK INDEX (RCRI)   The patient has the following serious cardiovascular risks for perioperative complications:   - No serious cardiac risks = 0 points    RCRI INTERPRETATION: 0 points: Class I (very low risk - 0.4% complication rate)         Signed Electronically by: Bobby Thrasher MD    Copy of this evaluation report is provided to requesting physician.

## 2021-06-19 NOTE — LETTER
Letter by Ana Cristina Suarez CNP at      Author: Ana Cristina Suarez CNP Service: -- Author Type: --    Filed:  Encounter Date: 6/6/2019 Status: (Other)         June 6, 2019     Patient: Patti Herrera   YOB: 1975   Date of Visit: 6/6/2019       To Whom it May Concern:    Patti Herrera was seen in my clinic on 6/6/2019. Please excuse patient from work on 6/5/19 and 6/6/19 due to an illness.     If you have any questions or concerns, please don't hesitate to call.    Sincerely,         Electronically signed by Ana Cristina Suarez CNP
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
## 2021-06-22 NOTE — PROGRESS NOTES
Patient ID: Patti Herrera is a 43 y.o. female.  /64   Pulse 78   Temp 98.6  F (37  C)   SpO2 98%     Assessment/Plan:                   Diagnoses and all orders for this visit:    Acute pharyngitis, unspecified etiology  -     Rapid Strep A Screen- Throat Swab  -     Group A Strep, RNA Direct Detection, Throat    Thrush    Gastroesophageal reflux disease, esophagitis presence not specified    Other orders  -     nystatin (MYCOSTATIN) 100,000 unit/mL suspension; Take 5 mL (500,000 Units total) by mouth 4 (four) times a day Swish and swallow.  Dispense: 473 mL; Refill: 0  -     sucralfate (CARAFATE) 100 mg/mL suspension; Take 10 mL (1 g total) by mouth 4 (four) times a day.  Dispense: 420 mL; Refill: 0          DISCUSSION  The clinical history and exam findings most consistent with thrush and probable acid reflux contributing to the throat pain etiology.  Less likely to be infectious but viral etiology would be most likely given the negative rapid strep and concurrent antibiotics at the time of onset of the sore throat.  Having been recently intubated may be a contributing factor.  We will treat the thrush with nystatin swish and swallow 4 times daily.  We will treat the acid reflux with her current proton pump inhibitor but add sucralfate.  If symptoms do not improve may need to consider other treatment options or reevaluation.  Subjective:     HPI    Patti Herrera is a 43 y.o. female who had a discectomy performed last week 2 days later developed a sore throat.  Her throat pain is worsened.  Patient states she still able to swallow it is more difficult.  Reports no difficulty with breathing.  Does not feel feverish.  On pain medication currently postoperatively.  Was given 4 doses of Keflex following her surgery.  Patient does have a history of acid reflux.  Reports some symptoms last week just prior to the onset of her throat pain symptoms.  Patient also notes that there is a thick white coating on her  tongue which is new and different.  Seems to have occurred around the same time that the sore throat started.  She denies any other problems no dizziness lightheadedness syncope no shortness of breath or chest pain.    Review of Systems          Objective:   Medications:  Current Outpatient Medications   Medication Sig Note     albuterol (PROVENTIL HFA) 90 mcg/actuation inhaler Inhale 2 puffs.      albuterol (PROVENTIL) 2.5 mg /3 mL (0.083 %) nebulizer solution Inhale 2.5 mg.      amitriptyline (ELAVIL) 25 MG tablet Take 1 tablet by mouth at bedtime as needed.            atorvastatin (LIPITOR) 20 MG tablet Take 20 mg by mouth.      cephalexin (KEFLEX) 500 MG capsule TK ONE C PO  Q 6 H      cyclobenzaprine (FLEXERIL) 10 MG tablet Take 1 tablet (10 mg total) by mouth 3 (three) times a day as needed for muscle spasms.      diazePAM (VALIUM) 5 MG tablet TK 1 T PO Q 6-8 H PRF SPASMS      hydrocortisone (ANUCORT-HC) 25 mg suppository Insert 1 suppository (25 mg total) into the rectum daily as needed.      HYDROmorphone (DILAUDID) 2 MG tablet TK ONE TO TWO TS PO Q 3 - 4 H FOR PAIN      ibuprofen (ADVIL,MOTRIN) 600 MG tablet Take 600 mg by mouth.      LINZESS 290 mcg cap capsule Take 1 capsule (290 mcg total) by mouth once a week.      pantoprazole (PROTONIX) 40 MG tablet TAKE ONE TABLET BY MOUTH EVERY DAY      sucralfate (CARAFATE) 1 gram tablet Take 1 tablet by mouth daily. 3/7/2016: Received from: External Pharmacy Received Sig:      hydrOXYzine HCl (ATARAX) 50 MG tablet TAKE 3 TABLETS BY MOUTH AT BEDTIME AS NEEDED FOR SLEEP      nystatin (MYCOSTATIN) 100,000 unit/mL suspension Take 5 mL (500,000 Units total) by mouth 4 (four) times a day Swish and swallow.      sucralfate (CARAFATE) 100 mg/mL suspension Take 10 mL (1 g total) by mouth 4 (four) times a day.        Allergies:  Allergies   Allergen Reactions     Codeine Nausea And Vomiting     Morphine Nausea And Vomiting       Tobacco:   reports that she has quit smoking.  she has never used smokeless tobacco.     Physical Exam          /64   Pulse 78   Temp 98.6  F (37  C)   SpO2 98%         General Appearance:    Alert, cooperative, no distress, appears stated age   Eyes:   No scleral icterus or conjunctival irritation       Ears:    Normal TM's and external ear canals, both ears   Throat:  White coating on the tongue consistent with thrush.  Redness in the posterior pharynx no sign of abscess no exudate.   Neck:   Supple, symmetrical, trachea midline, no adenopathy;        thyroid:  No enlargement/tenderness/nodules   Lungs:     Clear to auscultation bilaterally, respirations unlabored, no wheezes or crackles   Heart:    Regular rate and rhythm,  no murmur, rub   or gallop   Extremities:   Extremities normal, atraumatic, no cyanosis or edema   Skin:   Skin color, texture, turgor normal, no rashes or lesions   Neurologic:   Normal strength and sensation        throughout on gross examination.

## 2021-07-03 NOTE — ADDENDUM NOTE
Addendum Note by Danielle Aguero MD at 11/21/2018 11:40 AM     Author: Danielle Aguero MD Service: -- Author Type: Physician    Filed: 11/21/2018 12:46 PM Encounter Date: 11/21/2018 Status: Signed    : Danielle Aguero MD (Physician)    Addended by: DANIELLE AGUERO on: 11/21/2018 12:46 PM        Modules accepted: Orders

## 2021-08-21 ENCOUNTER — HEALTH MAINTENANCE LETTER (OUTPATIENT)
Age: 46
End: 2021-08-21

## 2021-10-16 ENCOUNTER — HEALTH MAINTENANCE LETTER (OUTPATIENT)
Age: 46
End: 2021-10-16

## 2022-01-06 ENCOUNTER — OFFICE VISIT (OUTPATIENT)
Dept: FAMILY MEDICINE | Facility: CLINIC | Age: 47
End: 2022-01-06
Payer: COMMERCIAL

## 2022-01-06 ENCOUNTER — TELEPHONE (OUTPATIENT)
Dept: FAMILY MEDICINE | Facility: CLINIC | Age: 47
End: 2022-01-06

## 2022-01-06 VITALS
DIASTOLIC BLOOD PRESSURE: 76 MMHG | OXYGEN SATURATION: 97 % | HEART RATE: 87 BPM | WEIGHT: 143.7 LBS | HEIGHT: 66 IN | SYSTOLIC BLOOD PRESSURE: 114 MMHG | TEMPERATURE: 98.8 F | BODY MASS INDEX: 23.09 KG/M2

## 2022-01-06 DIAGNOSIS — M25.521 RIGHT ELBOW PAIN: ICD-10-CM

## 2022-01-06 DIAGNOSIS — M77.8 RIGHT ELBOW TENDONITIS: ICD-10-CM

## 2022-01-06 DIAGNOSIS — Z01.818 PRE-OPERATIVE EXAMINATION: Primary | ICD-10-CM

## 2022-01-06 PROBLEM — E78.00 HYPERCHOLESTEREMIA: Status: ACTIVE | Noted: 2021-07-11

## 2022-01-06 PROBLEM — K58.9 IBS (IRRITABLE BOWEL SYNDROME): Status: ACTIVE | Noted: 2021-07-11

## 2022-01-06 PROBLEM — J34.89 SINUS PRESSURE: Status: ACTIVE | Noted: 2021-12-05

## 2022-01-06 PROBLEM — M77.11 LATERAL EPICONDYLITIS OF RIGHT ELBOW: Status: ACTIVE | Noted: 2022-01-06

## 2022-01-06 PROCEDURE — 99214 OFFICE O/P EST MOD 30 MIN: CPT | Performed by: FAMILY MEDICINE

## 2022-01-06 RX ORDER — TEGASEROD 6 MG/1
6 TABLET ORAL
COMMUNITY
Start: 2021-08-03

## 2022-01-06 ASSESSMENT — ASTHMA QUESTIONNAIRES
QUESTION_4 LAST FOUR WEEKS HOW OFTEN HAVE YOU USED YOUR RESCUE INHALER OR NEBULIZER MEDICATION (SUCH AS ALBUTEROL): NOT AT ALL
QUESTION_2 LAST FOUR WEEKS HOW OFTEN HAVE YOU HAD SHORTNESS OF BREATH: NOT AT ALL
QUESTION_1 LAST FOUR WEEKS HOW MUCH OF THE TIME DID YOUR ASTHMA KEEP YOU FROM GETTING AS MUCH DONE AT WORK, SCHOOL OR AT HOME: NONE OF THE TIME
QUESTION_3 LAST FOUR WEEKS HOW OFTEN DID YOUR ASTHMA SYMPTOMS (WHEEZING, COUGHING, SHORTNESS OF BREATH, CHEST TIGHTNESS OR PAIN) WAKE YOU UP AT NIGHT OR EARLIER THAN USUAL IN THE MORNING: NOT AT ALL
ACT_TOTALSCORE: 25
ACUTE_EXACERBATION_TODAY: NO
QUESTION_5 LAST FOUR WEEKS HOW WOULD YOU RATE YOUR ASTHMA CONTROL: COMPLETELY CONTROLLED

## 2022-01-06 ASSESSMENT — MIFFLIN-ST. JEOR: SCORE: 1302.54

## 2022-01-06 NOTE — PROGRESS NOTES
[facility]  [address]   [facility] [address]  Phone: [phone]  Fax: [phone]  Primary Provider: Darlyn Gutierres  Pre-op Performing Provider: QAMAR TAYLOR      PREOPERATIVE EVALUATION:  Today's date: 1/6/2022    Patti Herrera is a 46 year old female who presents for a preoperative evaluation.    Surgical Information:  Surgery/Procedure: elbow lateral extensor tendon repair, possible allograft reconstruction, possible lateral ulnar collateral ligament reconstruction  Surgery Location: [facility]  Surgeon: dr scarlett lundy  Surgery Date: 1-14-21  Time of Surgery: 7:50am  Where patient plans to recover: At home with family  Fax number for surgical facility: [phone]    Type of Anesthesia Anticipated: to be determined    Assessment & Plan     The proposed surgical procedure is considered LOW risk.    Pre-operative examination  No contraindications or significant risk factors to planned procedure.  Current medical conditions are stable.  She has Covid test scheduled next week    Right elbow pain/right elbow tendonitis  Okay to proceed with planned procedure                   RECOMMENDATION:  APPROVAL GIVEN to proceed with proposed procedure, without further diagnostic evaluation.                      Subjective     HPI related to upcoming procedure: She has a history of lateral epicondylitis of the right elbow.  She has done physical therapy and cortisone injections.  Recently experienced rupture of the tendon and is now scheduled for surgical treatment.  We reviewed her health history.  She has history of IBS-C, GERD and asthma.  Current medical conditions are stable.  Medications and allergies are reviewed and updated.  Review of systems is assessed and is otherwise negative.  No other concerns or questions today.    Preop Questions 1/6/2022   1. Have you ever had a heart attack or stroke? No   2. Have you ever had surgery on your heart  or blood vessels, such as a stent placement, a coronary artery bypass, or surgery on an artery in your head, neck, heart, or legs? No   3. Do you have chest pain with activity? No   4. Do you have a history of  heart failure? No   5. Do you currently have a cold, bronchitis or symptoms of other infection? No   6. Do you have a cough, shortness of breath, or wheezing? No   7. Do you or anyone in your family have previous history of blood clots? No   8. Do you or does anyone in your family have a serious bleeding problem such as prolonged bleeding following surgeries or cuts? No   9. Have you ever had problems with anemia or been told to take iron pills? No   10. Have you had any abnormal blood loss such as black, tarry or bloody stools, or abnormal vaginal bleeding? No   11. Have you ever had a blood transfusion? YES -    11a. Have you ever had a transfusion reaction? No   12. Are you willing to have a blood transfusion if it is medically needed before, during, or after your surgery? Yes   13. Have you or any of your relatives ever had problems with anesthesia? No   14. Do you have sleep apnea, excessive snoring or daytime drowsiness? No   15. Do you have any artifical heart valves or other implanted medical devices like a pacemaker, defibrillator, or continuous glucose monitor? No   16. Do you have artificial joints? No   17. Are you allergic to latex? No   18. Is there any chance that you may be pregnant? No       Health Care Directive:  Patient does not have a Health Care Directive or Living Will: Discussed advance care planning with patient; however, patient declined at this time.    Preoperative Review of :   reviewed - no record of controlled substances prescribed.      Status of Chronic Conditions:  See problem list for active medical problems.  Problems all longstanding and stable, except as noted/documented.  See ROS for pertinent symptoms related to these conditions.      Review of  Systems  Constitutional, neuro, ENT, endocrine, pulmonary, cardiac, gastrointestinal, genitourinary, musculoskeletal, integument and psychiatric systems are negative, except as otherwise noted.    Patient Active Problem List    Diagnosis Date Noted     Lateral epicondylitis of right elbow 01/06/2022     Priority: Medium     Formatting of this note might be different from the original.  Added automatically from request for surgery [id number]       Sinus pressure 12/05/2021     Priority: Medium     Hypercholesteremia 07/11/2021     Priority: Medium     IBS (irritable bowel syndrome) 07/11/2021     Priority: Medium     Asthma      Priority: Medium     Trigger: URIs         Median arcuate ligament syndrome (H) 08/11/2016     Priority: Medium     Temporomandibular Joint-pain Dysfunction Syndrome      Priority: Medium     Created by Conversion  Replacement Utility updated for latest IMO load         Scapular dysfunction 06/13/2016     Priority: Medium     Esophageal Reflux      Priority: Medium     Created by Conversion         Female Stress Incontinence      Priority: Medium     Created by Conversion         Vaginal vault prolapse after hysterectomy 11/15/2013     Priority: Medium     Cystourethrocele 11/15/2013     Priority: Medium      No past medical history on file.  Past Surgical History:   Procedure Laterality Date     HC REMOVAL GALLBLADDER      Description: Cholecystectomy;  Recorded: 11/28/2007;     HIP SURGERY Bilateral     IT band ligament repairs      OTHER SURGICAL HISTORY      medial arcuate ligament release     ZZC LAP,VAG HYST,UTERUS 250GMS/<,SALP-OOPH      Description: Laparoscopy With Vaginal Hysterectomy;  Recorded: 11/28/2007;     Current Outpatient Medications   Medication Sig Dispense Refill     albuterol (PROVENTIL HFA) 90 mcg/actuation inhaler [ALBUTEROL (PROVENTIL HFA) 90 MCG/ACTUATION INHALER] Inhale 2 puffs.       albuterol (PROVENTIL) 2.5 mg /3 mL (0.083 %) nebulizer solution [ALBUTEROL  (PROVENTIL) 2.5 MG /3 ML (0.083 %) NEBULIZER SOLUTION] Inhale 2.5 mg.       cyclobenzaprine (FLEXERIL) 10 MG tablet [CYCLOBENZAPRINE (FLEXERIL) 10 MG TABLET] TAKE 1 TABLET(10 MG) BY MOUTH THREE TIMES DAILY AS NEEDED FOR MUSCLE SPASMS 30 tablet 0     esomeprazole (NEXIUM) 20 MG capsule [ESOMEPRAZOLE (NEXIUM) 20 MG CAPSULE] Take 1 capsule (20 mg total) by mouth daily. 30 capsule 1     hydrocortisone (ANUCORT-HC) 25 mg suppository [HYDROCORTISONE (ANUCORT-HC) 25 MG SUPPOSITORY] Insert 1 suppository (25 mg total) into the rectum daily as needed. 24 suppository 0     ibuprofen (ADVIL,MOTRIN) 600 MG tablet [IBUPROFEN (ADVIL,MOTRIN) 600 MG TABLET] Take 600 mg by mouth.       Tegaserod Maleate (ZELNORM) 6 MG TABS Take 6 mg by mouth       tiZANidine (ZANAFLEX) 4 MG tablet   See Instructions, Instructions: 1 tab tid prn, # 30 tab(s), 0 Refill(s), Type: Maintenance, Pharmacy: [id number], 1 tab tid prn, 67, in, 12/05/21 17:13:00 CST, Height Measured, 144, lb, 12/05/21 17:13:00 CST, Weight Measured       MOTEGRITY 2 mg Tab [MOTEGRITY 2 MG TAB]  (Patient not taking: Reported on 1/6/2022)         Allergies   Allergen Reactions     Codeine Nausea and Vomiting     Morphine Nausea and Vomiting        Social History     Tobacco Use     Smoking status: Former Smoker     Smokeless tobacco: Never Used     Tobacco comment: socially   Substance Use Topics     Alcohol use: Yes     Alcohol/week: 1.0 standard drink     Family History   Problem Relation Age of Onset     Acute Myocardial Infarction Mother      Heart Disease Mother      Hypertension Father      Hyperlipidemia Father      Heart Disease Father      Prostate Cancer Father      Colon Cancer Maternal Grandfather      Other - See Comments Sister         DJD      Other - See Comments Brother         DJD     No Known Problems Maternal Grandmother      Kidney Nephrosis Paternal Grandmother      Colitis Paternal Grandfather      Depression Daughter      Depression  "Daughter      No Known Problems Son      History   Drug Use No         Objective     /76 (BP Location: Left arm, Patient Position: Sitting, Cuff Size: Adult Large)   Pulse 87   Temp 98.8  F (37.1  C) (Temporal)   Ht 1.683 m (5' 6.25\")   Wt 65.2 kg (143 lb 11.2 oz)   SpO2 97%   BMI 23.02 kg/m      Physical Exam    GENERAL APPEARANCE: healthy, alert and no distress     EYES: EOMI, PERRL     HENT: ear canals and TM's normal     NECK: no adenopathy, no asymmetry, masses, or scars and thyroid normal to palpation     RESP: lungs clear to auscultation - no rales, rhonchi or wheezes     CV: regular rates and rhythm, normal S1 S2, no S3 or S4 and no murmur, click or rub     ABDOMEN:  soft, nontender, no HSM or masses and bowel sounds normal     MS: extremities normal- no gross deformities noted, no evidence of inflammation in joints, FROM in all extremities.     SKIN: no suspicious lesions or rashes     NEURO: Normal strength and tone, sensory exam grossly normal, mentation intact and speech normal     PSYCH: mentation appears normal. and affect normal/bright     LYMPHATICS: No cervical adenopathy    No results for input(s): HGB, PLT, INR, NA, POTASSIUM, CR, A1C in the last 40820 hours.     Diagnostics:  No labs were ordered during this visit.   No EKG required for low risk surgery (cataract, skin procedure, breast biopsy, etc).    Revised Cardiac Risk Index (RCRI):  The patient has the following serious cardiovascular risks for perioperative complications:   - No serious cardiac risks = 0 points     RCRI Interpretation: 0 points: Class I (very low risk - 0.4% complication rate)           Signed Electronically by: Malissa Cuenca MD  Copy of this evaluation report is provided to requesting physician.      "

## 2022-01-07 ASSESSMENT — ASTHMA QUESTIONNAIRES: ACT_TOTALSCORE: 25

## 2022-06-30 ENCOUNTER — TRANSFERRED RECORDS (OUTPATIENT)
Dept: HEALTH INFORMATION MANAGEMENT | Facility: CLINIC | Age: 47
End: 2022-06-30

## 2022-07-15 ENCOUNTER — TRANSFERRED RECORDS (OUTPATIENT)
Dept: HEALTH INFORMATION MANAGEMENT | Facility: CLINIC | Age: 47
End: 2022-07-15

## 2022-07-21 ENCOUNTER — TRANSFERRED RECORDS (OUTPATIENT)
Dept: HEALTH INFORMATION MANAGEMENT | Facility: CLINIC | Age: 47
End: 2022-07-21

## 2022-07-22 ENCOUNTER — TRANSFERRED RECORDS (OUTPATIENT)
Dept: HEALTH INFORMATION MANAGEMENT | Facility: CLINIC | Age: 47
End: 2022-07-22

## 2022-08-10 ENCOUNTER — TRANSFERRED RECORDS (OUTPATIENT)
Dept: HEALTH INFORMATION MANAGEMENT | Facility: CLINIC | Age: 47
End: 2022-08-10

## 2022-08-26 ENCOUNTER — TRANSFERRED RECORDS (OUTPATIENT)
Dept: HEALTH INFORMATION MANAGEMENT | Facility: CLINIC | Age: 47
End: 2022-08-26

## 2022-10-01 ENCOUNTER — HEALTH MAINTENANCE LETTER (OUTPATIENT)
Age: 47
End: 2022-10-01

## 2022-12-01 ENCOUNTER — TRANSFERRED RECORDS (OUTPATIENT)
Dept: HEALTH INFORMATION MANAGEMENT | Facility: CLINIC | Age: 47
End: 2022-12-01

## 2022-12-22 ENCOUNTER — TRANSFERRED RECORDS (OUTPATIENT)
Dept: HEALTH INFORMATION MANAGEMENT | Facility: CLINIC | Age: 47
End: 2022-12-22

## 2023-10-15 ENCOUNTER — HEALTH MAINTENANCE LETTER (OUTPATIENT)
Age: 48
End: 2023-10-15

## 2024-12-07 ENCOUNTER — HEALTH MAINTENANCE LETTER (OUTPATIENT)
Age: 49
End: 2024-12-07